# Patient Record
Sex: MALE | Race: WHITE
[De-identification: names, ages, dates, MRNs, and addresses within clinical notes are randomized per-mention and may not be internally consistent; named-entity substitution may affect disease eponyms.]

---

## 2021-05-05 ENCOUNTER — HOSPITAL ENCOUNTER (OUTPATIENT)
Dept: HOSPITAL 47 - LABWHC1 | Age: 41
Discharge: HOME | End: 2021-05-05
Payer: COMMERCIAL

## 2021-05-05 DIAGNOSIS — Z20.822: Primary | ICD-10-CM

## 2022-01-30 ENCOUNTER — HOSPITAL ENCOUNTER (EMERGENCY)
Dept: HOSPITAL 47 - EC | Age: 42
Discharge: HOME | End: 2022-01-30
Payer: COMMERCIAL

## 2022-01-30 VITALS
HEART RATE: 104 BPM | DIASTOLIC BLOOD PRESSURE: 83 MMHG | TEMPERATURE: 98.2 F | RESPIRATION RATE: 18 BRPM | SYSTOLIC BLOOD PRESSURE: 129 MMHG

## 2022-01-30 DIAGNOSIS — S43.102A: Primary | ICD-10-CM
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
## 2022-01-30 DIAGNOSIS — F17.200: ICD-10-CM

## 2022-01-30 PROCEDURE — 73030 X-RAY EXAM OF SHOULDER: CPT

## 2022-01-30 PROCEDURE — 96372 THER/PROPH/DIAG INJ SC/IM: CPT

## 2022-01-30 PROCEDURE — 99283 EMERGENCY DEPT VISIT LOW MDM: CPT

## 2022-01-30 NOTE — XR
EXAMINATION TYPE: XR shoulder complete LT

 

DATE OF EXAM: 1/30/2022

 

COMPARISON: NONE

 

HISTORY: Pain

 

TECHNIQUE: 3 views

 

FINDINGS: There is no fracture. Glenohumeral joint is intact. There is malalignment of the AC joint t
hat measures 10 mm.

 

IMPRESSION: AC joint ligamentous tear. There is also probably a tear of the coracoclavicular ligament
. No fracture seen.

## 2022-01-30 NOTE — ED
Fall HPI





- General


Chief Complaint: Fall


Stated Complaint: Fall,Lt Shoulder Pain


Time Seen by Provider: 01/30/22 17:34


Source: patient


Mode of arrival: ambulatory





- History of Present Illness


Initial Comments: 





This 41-year-old male percents emergency department after slipping and falling 

on ice, falling onto his left shoulder yesterday around 4 PM.  Patient states he

went to get out of his truck and when he went to check the door he slipped on 

ice and hit his left shoulder directly on ice.  Patient denies hitting his head,

loss of consciousness or being on any blood thinners.  Patient states he has 

never had any injury to the shoulder in the past and he has not had any prior 

surgery to this left shoulder in his past.  Patient denies any chest pain, 

shortness of breath, abdominal pain, change in bowel or bladder, change in 

vision, headache or dizziness, lightheadedness.





- Related Data


                                  Previous Rx's











 Medication  Instructions  Recorded


 


Amoxicillin/Potassium Clav 1 tab PO Q12HR #20 tab 03/22/16





[Augmentin 875-125 Tablet]  


 


methylPREDNISolone [Medrol Dose 4 mg PO AS DIRECTED #1 pack 03/22/16





Pack]  











                                    Allergies











Allergy/AdvReac Type Severity Reaction Status Date / Time


 


No Known Allergies Allergy   Verified 01/30/22 17:32














Review of Systems


ROS Statement: 


Those systems with pertinent positive or pertinent negative responses have been 

documented in the HPI.





ROS Other: All systems not noted in ROS Statement are negative.





Past Medical History


Past Medical History: No Reported History


History of Any Multi-Drug Resistant Organisms: None Reported


Past Surgical History: No Surgical Hx Reported


Past Psychological History: No Psychological Hx Reported


Smoking Status: Current some day smoker


Past Alcohol Use History: Occasional


Past Drug Use History: None Reported





General Exam


Limitations: no limitations


General appearance: alert, in no apparent distress


Head exam: Present: atraumatic, normocephalic


Eye exam: Present: normal appearance, PERRL, EOMI


Pupils: Present: normal accommodation


ENT exam: Present: normal exam, mucous membranes moist


Neck exam: Present: normal inspection, full ROM.  Absent: tenderness, 

meningismus, lymphadenopathy


Respiratory exam: Present: normal lung sounds bilaterally.  Absent: respiratory 

distress, wheezes, rales, rhonchi, stridor


Cardiovascular Exam: Present: regular rate, normal rhythm, normal heart sounds. 

Absent: systolic murmur, diastolic murmur, rubs, gallop, clicks


GI/Abdominal exam: Present: soft, normal bowel sounds.  Absent: distended, 

tenderness, guarding, rebound, rigid


Extremities exam: Absent: other (Patient has full range of motion of right arm. 

Patient unable to extend his arm laterally or anteriorly due to pain.  Patient 

is able to straighten and flex his elbow.  Subtle left shouldered deformity 

noted.  Patient with sensation intact.  Radial and ulnar pulses palpable. )


  ** Left


General: Present: other (Left posterior and anterior shoulder tender to light 

palpation.  Tender to palpation over the left AC joint.  No pain to palpation 

over clavicle.  Acromion process of the left shoulder tender to palpation.  No 

swelling, erythema or warmth noted)


Back exam: Present: full ROM.  Absent: tenderness, CVA tenderness (R), CVA 

tenderness (L), vertebral tenderness


Neurological exam: Present: alert, oriented X3, CN II-XII intact, normal gait


Psychiatric exam: Present: normal affect, normal mood


Skin exam: Present: warm, dry, intact, normal color.  Absent: rash





Course


                                   Vital Signs











  01/30/22





  17:30


 


Temperature 98.2 F


 


Pulse Rate 104 H


 


Respiratory 18





Rate 


 


Blood Pressure 129/83


 


O2 Sat by Pulse 96





Oximetry 














- Reevaluation(s)


Reevaluation #1: 





01/30/22 19:30


He states the morphine helped and a lot of his pain has been relieved.  He 

states when he tries to move his arm is still causes pain.  Informed him to keep

arm still and without movement until sling is placed. 


01/30/22 19:45








Procedures





- Orthopedic Splinting/Casting


  ** Injury #1


Side: left


Upper Extremity Injury Location: shoulder


Upper Extremity Immobilizer: sling/shoulder immobilizer





Medical Decision Making





- Medical Decision Making





This 41-year-old male presents to emergency department after slipping and 

falling on ice yesterday, landing on his left shoulder.  Left shoulder x-ray 

impression: AC joint ligamentous tear.  There is also probably a tear of 

coracoclavicular ligament.  No fracture seen.  Glenohumeral joint is intact.  

There is malalignment of the AC joint that measures 10 mm.  IM morphine given 

the patient here which relieved his pain.  Patient placed in a shoulder/arm 

immobilizer.  Patient to follow-up with orthopedics in next 24-48 hours.  Strict

return precautions were discussed.  Patient verbally agreed to plan.  Patient 

sent home in stable condition.  Case discussed with my attending, Dr. Vaz.





Disposition


Clinical Impression: 


 Acromioclavicular (AC) joint injury, Acromioclavicular joint separation, Pain 

of left shoulder joint on movement, Left shoulder pain





Disposition: HOME SELF-CARE


Condition: Stable


Instructions (If sedation given, give patient instructions):  Acromioclavicular 

Separation (ED), Shoulder Pain (ED)


Additional Instructions: 


Return to the emergency department with any concerning, new, or worsening 

symptoms.  Please follow up with orthopedics in the next 24-48 hours.  Keep arm 

immobilized with sling in place until you see orthopedics.  Take medication as 

directed.


Is patient prescribed a controlled substance at d/c from ED?: No


Referrals: 


Dolores Holloway MD [Primary Care Provider] - 1-2 days


Da Parker DO [Doctor of Osteopathic Medicine] - 1-2 days


Time of Disposition: 19:21